# Patient Record
Sex: FEMALE | Race: WHITE | NOT HISPANIC OR LATINO | Employment: UNEMPLOYED | URBAN - METROPOLITAN AREA
[De-identification: names, ages, dates, MRNs, and addresses within clinical notes are randomized per-mention and may not be internally consistent; named-entity substitution may affect disease eponyms.]

---

## 2017-08-29 DIAGNOSIS — O24.119 PRE-EXISTING TYPE 2 DIABETES MELLITUS DURING PREGNANCY, ANTEPARTUM: Primary | ICD-10-CM

## 2017-09-07 ENCOUNTER — OFFICE VISIT (OUTPATIENT)
Dept: MATERNAL FETAL MEDICINE | Facility: CLINIC | Age: 32
End: 2017-09-07
Attending: OBSTETRICS & GYNECOLOGY
Payer: COMMERCIAL

## 2017-09-07 VITALS — WEIGHT: 238.75 LBS | SYSTOLIC BLOOD PRESSURE: 126 MMHG | DIASTOLIC BLOOD PRESSURE: 84 MMHG

## 2017-09-07 DIAGNOSIS — O24.312 PREEXISTING DIABETES COMPLICATING PREGNANCY IN SECOND TRIMESTER, ANTEPARTUM: ICD-10-CM

## 2017-09-07 DIAGNOSIS — O24.119 PRE-EXISTING TYPE 2 DIABETES MELLITUS DURING PREGNANCY, ANTEPARTUM: ICD-10-CM

## 2017-09-07 PROCEDURE — 99204 OFFICE O/P NEW MOD 45 MIN: CPT | Mod: 25,S$GLB,, | Performed by: OBSTETRICS & GYNECOLOGY

## 2017-09-07 PROCEDURE — 3008F BODY MASS INDEX DOCD: CPT | Mod: S$GLB,,, | Performed by: OBSTETRICS & GYNECOLOGY

## 2017-09-07 PROCEDURE — 99999 PR PBB SHADOW E&M-EST. PATIENT-LVL II: CPT | Mod: PBBFAC,,, | Performed by: OBSTETRICS & GYNECOLOGY

## 2017-09-07 PROCEDURE — 76811 OB US DETAILED SNGL FETUS: CPT | Mod: S$GLB,,, | Performed by: OBSTETRICS & GYNECOLOGY

## 2017-09-07 RX ORDER — METFORMIN HYDROCHLORIDE 1000 MG/1
1000 TABLET ORAL NIGHTLY
COMMUNITY
End: 2017-10-04 | Stop reason: DRUGHIGH

## 2017-09-07 RX ORDER — FLUTICASONE PROPIONATE 50 MCG
1 SPRAY, SUSPENSION (ML) NASAL DAILY
COMMUNITY

## 2017-09-07 RX ORDER — CETIRIZINE HYDROCHLORIDE 10 MG/1
10 TABLET ORAL DAILY
COMMUNITY

## 2017-09-07 RX ORDER — ACETAMINOPHEN 325 MG/1
325 TABLET ORAL EVERY 6 HOURS PRN
COMMUNITY

## 2017-09-07 RX ORDER — GUAIFENESIN 600 MG/1
1200 TABLET, EXTENDED RELEASE ORAL 2 TIMES DAILY
COMMUNITY

## 2017-09-07 RX ORDER — PRENATAL WITH FERROUS FUM AND FOLIC ACID 3080; 920; 120; 400; 22; 1.84; 3; 20; 10; 1; 12; 200; 27; 25; 2 [IU]/1; [IU]/1; MG/1; [IU]/1; MG/1; MG/1; MG/1; MG/1; MG/1; MG/1; UG/1; MG/1; MG/1; MG/1; MG/1
1 TABLET ORAL DAILY
COMMUNITY

## 2017-09-07 RX ORDER — METFORMIN HYDROCHLORIDE 500 MG/1
500 TABLET ORAL
COMMUNITY
End: 2017-09-13 | Stop reason: SDUPTHER

## 2017-09-07 NOTE — PROGRESS NOTES
Chief complaint: Diabetes in pregnancy    Provider requesting consultation: Dr. garcia    31 y.o. [id number] 24w6d EGA    PMH:  Past Medical History:   Diagnosis Date    Diabetes mellitus     type 2 and GDM        PObHx:  OB History    Para Term  AB Living   3 2 2     1   SAB TAB Ectopic Multiple Live Births           1      # Outcome Date GA Lbr Rodolfo/2nd Weight Sex Delivery Anes PTL Lv   3 Current            2 Term  40w0d  3.345 kg (7 lb 6 oz) M Vag-Spont  N RAMA   1 Term 07 39w0d  3.572 kg (7 lb 14 oz) M Vag-Spont  N       Birth Comments: GDM           PSH:History reviewed. No pertinent surgical history.    Family history:family history is not on file.    Social history: reports that she has never smoked. She has never used smokeless tobacco. She reports that she does not drink alcohol or use drugs.    A detailed fetal anatomical ultrasound was completed today.  See details in imaging section of [facility].    The patient has just recently moved from [facility] to [facility].  She has already been counseled on diabetes in pregnancy and is on diet and taking metformin 1000 mg at HS and 500 mg with breakfast.  I added 500 mg at dinner.    I had a long discussion concerning diet and intake, I did a dietary recall with her. She will delete fruit at lunch.  Her dinner and breakfast seems appropriate.  I demonstrated how to get nutritional information for chain restaurants on line.      Diabetes in pregnancy:  Today the patient was counseled on the risks of diabetes in pregnancy.  I reviewed the physiologic effects of pregnancy on diabetes and I stressed with the patient the need for meticulous glucose control.  I discussed with the patient the increased risks of fetal structural anomalies, macrosomia, prematurity, shoulder dystocia,  hyperbilirubinemia, and sudden stillbirth in those patients with poor glucose control.   I also discussed with the patient the need for increased fetal surveillance with  monthly fetal growth assessments and third trimester fetal testing.  I also reviewed the possibility of need for early delivery in those with poor glucose control or evidence of fetal growth abnormalities.  Recommendations from our MFM group at [facility]:  -Diet and/or medication should be used to keep fasting glucose levels <90 mg/dL and 2 hour postprandial levels <120 mg/dL.  Metformin or glyburide are our recommended first line therapy for those who are unable to control glucose with diet alone.  -In those patients with diabetes existing prior to pregnancy we recommend a fetal echocardiogram around 22-24 weeks gestation to rule out congenital heart disease.  -Periodic fetal growth assessments should be performed every 4-6 weeks to assess for fetal growth abnormalities.    -Twice weekly non-stress tests should be instituted beginning at 32 weeks EGA for those patients requiring medication for control of their diabetes and should be continued until delivery.  -Secondary to the high incidence of preeclampsia in patients with pregestational diabetes we recommend a baseline assessment of renal function.  This can be accomplished by a serum creatinine and a 24 hour urine collection or a urine protein to creatinine (P/C) ratio.    -In regards to timing of delivery our group refers to the ACOG article by Marcella et al from 2011 that addresses Timing of Indicated Late- and Early-Term Birth:  -Well controlled pregestational and gestational diabetes: we do not recommend late  or early term delivery  -Pregestational diabetes with vascular disease: 37-39 weeks  -Pregestational poorly controlled: 34-39 weeks (individualized to situation)  -Gestational poorly controlled on medication: 34-39 weeks (individualized to situation)    I will see her back in 1 week to f/u diabetic control.

## 2017-09-07 NOTE — LETTER
September 7, 2017      Je Diggs MD  [address] [facility] [address]           [facility] - Maternal Fetal Med  [address] [facility] [address]  Phone: [phone]          Patient: Berna Patrick   MR Number: [id number]   YOB: 1985   Date of Visit: 9/7/2017       Dear Dr. Je Diggs:    Thank you for referring Berna Patrick to me for evaluation. Attached you will find relevant portions of my assessment and plan of care.    If you have questions, please do not hesitate to call me. I look forward to following Berna Patrick along with you.    Sincerely,    Anjel Pinon MD    Enclosure  CC:  No Recipients    If you would like to receive this communication electronically, please contact [email or web address] or [phone] to request more information on [employer] Link access.    For providers and/or their staff who would like to refer a patient to [facility], please contact us through our one-stop-shop provider referral line, [facility] , at [phone].    If you feel you have received this communication in error or would no longer like to receive these types of communications, please e-mail [email or web address]

## 2017-09-13 ENCOUNTER — ROUTINE PRENATAL (OUTPATIENT)
Dept: MATERNAL FETAL MEDICINE | Facility: CLINIC | Age: 32
End: 2017-09-13
Attending: OBSTETRICS & GYNECOLOGY
Payer: COMMERCIAL

## 2017-09-13 VITALS — DIASTOLIC BLOOD PRESSURE: 74 MMHG | WEIGHT: 238.56 LBS | SYSTOLIC BLOOD PRESSURE: 112 MMHG

## 2017-09-13 DIAGNOSIS — O24.912 DIABETES MELLITUS AFFECTING PREGNANCY IN SECOND TRIMESTER: Primary | ICD-10-CM

## 2017-09-13 PROCEDURE — 3008F BODY MASS INDEX DOCD: CPT | Mod: S$GLB,,, | Performed by: OBSTETRICS & GYNECOLOGY

## 2017-09-13 PROCEDURE — 99214 OFFICE O/P EST MOD 30 MIN: CPT | Mod: S$GLB,,, | Performed by: OBSTETRICS & GYNECOLOGY

## 2017-09-13 PROCEDURE — 99999 PR PBB SHADOW E&M-EST. PATIENT-LVL II: CPT | Mod: PBBFAC,,, | Performed by: OBSTETRICS & GYNECOLOGY

## 2017-09-13 RX ORDER — GLYBURIDE 2.5 MG/1
TABLET ORAL
Qty: 30 TABLET | Refills: 11 | Status: SHIPPED | OUTPATIENT
Start: 2017-09-13

## 2017-09-13 RX ORDER — METFORMIN HYDROCHLORIDE 500 MG/1
TABLET ORAL
Qty: 120 TABLET | Refills: 3 | Status: SHIPPED | OUTPATIENT
Start: 2017-09-13 | End: 2017-10-04

## 2017-09-13 NOTE — PROGRESS NOTES
Follow up consultation regarding diabetes in pregnancy provided today.  Risks of uncontrolled diabetes in pregnancy reviewed once again.  Blood glucose measurements assessed. Her post dinner sugars remain elevated with other values in target range.   I did adjust her medications today.  I added 2.5 mg glyburide to dinner.  She will fax sugars next week.    Results of today's ultrasound discussed with patient.  I spent 30 minutes with patient today over half of which was in consultation separate of her ultrasound examination.     Referring physician to receive copy of today's consultation via electronic medical record.

## 2017-09-18 ENCOUNTER — TELEPHONE (OUTPATIENT)
Dept: MATERNAL FETAL MEDICINE | Facility: CLINIC | Age: 32
End: 2017-09-18

## 2017-09-18 NOTE — TELEPHONE ENCOUNTER
Patient calling because Dr. Pinon started Glyburide 2.5 mg at dinner and has noted some low fasting blood sugars for herself (In the 70s and 60s).    Patient's current medication management for her Type 2 DM includes:    Metformin:  - 500 mg at breakfast  - 500 mg at dinner  - 1000 mg at bedtime    Glyburide  - 2.5 mg at dinner    Patient states when her fasting blood sugars are low, she can feel it and becomes slightly light headed. Patient states she will eat a snack/healthy carbohydrate at this time.    Nurse let patient know she would review with Dr. Pinon accordingly.    Patient is currently in [place], but states she can e-mail or fax a blood sugar log to [facility] accordingly.    Nurse let patient know she would reach out to patient later this afternoon after speaking with Dr. Pinon.    Patient verbalized understanding of all information received.    ----- Message from Kirsten Stephen sent at 9/18/2017  9:21 AM CDT -----  Contact: Self  Pt DarnellBerna MRN [id number] is calling re her meds. Pt can be reached at [phone]. sh

## 2017-09-18 NOTE — TELEPHONE ENCOUNTER
After discussing with Dr. Pinon, nurse phoned patient and let her know that he is okay with the lower fasting blood sugars and he would like her to increase her PM snack slightly if possible.    Patient verbalized understanding of information received.

## 2017-09-19 ENCOUNTER — TELEPHONE (OUTPATIENT)
Dept: MATERNAL FETAL MEDICINE | Facility: CLINIC | Age: 32
End: 2017-09-19

## 2017-09-19 NOTE — TELEPHONE ENCOUNTER
----- Message from Kirsten Stephen sent at 9/19/2017 10:47 AM CDT -----  Contact: Self  Pt Berna Patrick MRN [id number] is calling to see if test strips can be called in for her. Pt can be reached at [phone].Pt is in [facility] and is wondering if they can be called into the [facility] near her.     Test strip model #     Called into pharmacy [facility] at [phone]

## 2017-09-22 ENCOUNTER — TELEPHONE (OUTPATIENT)
Dept: MATERNAL FETAL MEDICINE | Facility: CLINIC | Age: 32
End: 2017-09-22

## 2017-09-22 NOTE — TELEPHONE ENCOUNTER
reviewed patient's blood sugars and patient was notified that there would be no changes to her medication to keep doing what she was doing and see us in 2 weeks.  Patient verbalized her understanding.

## 2017-09-28 DIAGNOSIS — O35.9XX0 SUSPECTED FETAL ABNORMALITY AFFECTING MANAGEMENT OF MOTHER, ANTEPARTUM, NOT APPLICABLE OR UNSPECIFIED FETUS: Primary | ICD-10-CM

## 2017-10-02 ENCOUNTER — OFFICE VISIT (OUTPATIENT)
Dept: PEDIATRIC CARDIOLOGY | Facility: CLINIC | Age: 32
End: 2017-10-02
Payer: COMMERCIAL

## 2017-10-02 ENCOUNTER — CLINICAL SUPPORT (OUTPATIENT)
Dept: PEDIATRIC CARDIOLOGY | Facility: CLINIC | Age: 32
End: 2017-10-02
Payer: COMMERCIAL

## 2017-10-02 VITALS
WEIGHT: 244.06 LBS | HEART RATE: 99 BPM | BODY MASS INDEX: 40.66 KG/M2 | DIASTOLIC BLOOD PRESSURE: 66 MMHG | HEIGHT: 65 IN | SYSTOLIC BLOOD PRESSURE: 105 MMHG

## 2017-10-02 DIAGNOSIS — O35.9XX0 SUSPECTED FETAL ABNORMALITY AFFECTING MANAGEMENT OF MOTHER, ANTEPARTUM, NOT APPLICABLE OR UNSPECIFIED FETUS: ICD-10-CM

## 2017-10-02 DIAGNOSIS — Z03.73 FETAL ANOMALY SUSPECTED BUT NOT FOUND: Primary | ICD-10-CM

## 2017-10-02 PROCEDURE — 99999 PR PBB SHADOW E&M-EST. PATIENT-LVL III: CPT | Mod: PBBFAC,,, | Performed by: PEDIATRICS

## 2017-10-02 PROCEDURE — 76825 ECHO EXAM OF FETAL HEART: CPT | Mod: S$GLB,,, | Performed by: PEDIATRICS

## 2017-10-02 PROCEDURE — 76827 ECHO EXAM OF FETAL HEART: CPT | Mod: S$GLB,,, | Performed by: PEDIATRICS

## 2017-10-02 PROCEDURE — 93325 DOPPLER ECHO COLOR FLOW MAPG: CPT | Mod: S$GLB,,, | Performed by: PEDIATRICS

## 2017-10-02 PROCEDURE — 99205 OFFICE O/P NEW HI 60 MIN: CPT | Mod: 25,S$GLB,, | Performed by: PEDIATRICS

## 2017-10-02 NOTE — LETTER
October 4, 2017      Je Diggs MD  [address] [facility]  [place] [address]           [place]- Pediatric Cardiology  [address] [place] [address]  [address]  [place] [address]  Phone: [phone]  Fax: [phone]          Patient: Berna Patrick   MR Number: [id number]   YOB: 1985   Date of Visit: 10/2/2017       Dear Dr. Je Diggs:    Thank you for referring Berna Patrick to me for evaluation. Attached you will find relevant portions of my assessment and plan of care.    If you have questions, please do not hesitate to call me. I look forward to following Berna Patrick along with you.    Sincerely,    Flip Davis MD    Enclosure  CC:  No Recipients    If you would like to receive this communication electronically, please contact [email or web address] or [phone] to request more information on [employer] Link access.    For providers and/or their staff who would like to refer a patient to [facility], please contact us through our one-stop-shop provider referral line, [facility], at [phone].    If you feel you have received this communication in error or would no longer like to receive these types of communications, please e-mail [email or web address]

## 2017-10-04 ENCOUNTER — OFFICE VISIT (OUTPATIENT)
Dept: MATERNAL FETAL MEDICINE | Facility: CLINIC | Age: 32
End: 2017-10-04
Payer: COMMERCIAL

## 2017-10-04 VITALS
WEIGHT: 243.81 LBS | SYSTOLIC BLOOD PRESSURE: 104 MMHG | DIASTOLIC BLOOD PRESSURE: 70 MMHG | BODY MASS INDEX: 40.58 KG/M2

## 2017-10-04 DIAGNOSIS — O24.912 DIABETES MELLITUS AFFECTING PREGNANCY IN SECOND TRIMESTER: ICD-10-CM

## 2017-10-04 DIAGNOSIS — O24.913 DIABETES MELLITUS DURING PREGNANCY IN THIRD TRIMESTER, UNSPECIFIED DIABETES MELLITUS TYPE: ICD-10-CM

## 2017-10-04 PROCEDURE — 99999 PR PBB SHADOW E&M-EST. PATIENT-LVL II: CPT | Mod: PBBFAC,,, | Performed by: OBSTETRICS & GYNECOLOGY

## 2017-10-04 PROCEDURE — 99213 OFFICE O/P EST LOW 20 MIN: CPT | Mod: 25,S$GLB,, | Performed by: OBSTETRICS & GYNECOLOGY

## 2017-10-04 PROCEDURE — 76816 OB US FOLLOW-UP PER FETUS: CPT | Mod: S$GLB,,, | Performed by: OBSTETRICS & GYNECOLOGY

## 2017-10-04 RX ORDER — METFORMIN HYDROCHLORIDE 500 MG/1
TABLET ORAL
Qty: 120 TABLET | Refills: 3 | Status: SHIPPED | OUTPATIENT
Start: 2017-10-04

## 2017-10-04 NOTE — PROGRESS NOTES
[facility] Fetal [facility]iology [facility]    Today, I had the pleasure of evaluating Berna Patrick who is now 31 y.o. and carrying her third pregnancy at 28 3/7 weeks gestation with an AIDA of 17. She was referred for evaluation of the fetal heart due to pre-existing and gestational diabetes mellitus that his controlled with diet, metformin, and glyburide.    She is carrying a female  fetus, named Payal Felipe.  She has felt the baby move.       Obstetric History:    .  Her OB history is otherwise unremarkable.  Her OB care is by Dr. Je Diggs.  She has seen Dr. Pinon of the [facility] service.    Past Medical History:   Diagnosis Date    Diabetes mellitus     type 2 and GDM          Current Outpatient Prescriptions:     acetaminophen (TYLENOL) 325 MG tablet, Take 325 mg by mouth every 6 (six) hours as needed for Pain., Disp: , Rfl:     cetirizine (ZYRTEC) 10 MG tablet, Take 10 mg by mouth once daily., Disp: , Rfl:     fluticasone (FLONASE) 50 mcg/actuation nasal spray, 1 spray by Each Nare route once daily., Disp: , Rfl:     glyBURIDE (DIABETA) 2.5 MG tablet, 2.5 mg with dinner, Disp: 30 tablet, Rfl: 11    guaifenesin (MUCINEX) 600 mg 12 hr tablet, Take 1,200 mg by mouth 2 (two) times daily., Disp: , Rfl:     metformin (GLUCOPHAGE) 1000 MG tablet, Take 1,000 mg by mouth nightly., Disp: , Rfl:     metformin (GLUCOPHAGE) 500 MG tablet, 500 mg with breakfast, 500 mg with dinner and 1000 mg at HS, Disp: 120 tablet, Rfl: 3    PNV,CALCIUM 72/IRON/FOLIC ACID (PRENATAL VITAMIN) Tab, Take 1 tablet by mouth once daily., Disp: , Rfl:      Allergies: Amoxicillin    Family History: Negative for congenital heart disease, early coronary artery disease, sudden unexplained death, connective tissues disorders, genetic syndromes, multiple miscarriages or other congenital anomalies.    Social History: Ms. Berna Patrick is  to the father of the baby.  The father of the baby is  involved. He works as an  at [employer].  She is a homemaker.    FETAL ECHOCARDIOGRAM (summary):  Normally connected heart.  Normal sinus rhythm throughout the study.  Normal fetal atrial and ductal level shunting.  No ventricular level shunting.  Normal atrioventricular and semilunar valve structure and function.  Normal ductal and aortic arches.  Normal biventricular size and systolic function.  No pericardial effusion.  Head circumference measures normal for gestational age, but the femur length  is about 2 weeks behind.  (Full report in electronic medical record)    Impression:  Single active female fetus at 28 wga.  Normal fetal echocardiogram.      Todays fetal echocardiogram is normal, within the limitations of fetal echocardiography.  I discussed with her that fetal echocardiography is insufficiently sensitive to rule out all septal defects, anomalies of pulmonary and systemic veins, arch anomalies, and some valvar abnormalities, nor can it ensure that the ductus arteriosus and foramen ovale will spontaneously close.     Recommendations:  Location, timing, and mode of delivery will be determined by the obstetrical team.  She does not require further follow-up in the fetal echocardiography clinic, but I would be happy to see her again if additional questions or concerns arise.    Should there be any concerns about the baby's heart after birth, a post-erasto echocardiogram and cardiology consultation are recommended.     The above information was discussed in detail including the use of diagrams, 60 minutes were used for the evaluation with half of that time in discussion and counseling.    Flip Davis MD, MPH  Pediatric and Fetal Cardiology  [facility]  [address] [place] [address]    Office: [phone]  Pager: [phone]

## 2017-10-05 NOTE — PROGRESS NOTES
"Indication  ========    r/t md: Evaluation of fetal growth/ BS check .    History  ======    General History  Other: Afp Tetra negative  age 1:690  DSR 1:2353    Previous Outcomes  Preg. no. 1  Outcome: Live YOB: 2005  Gest. age 39 w + 0 d  Gender: male  Details: vaginal delivery 7 lbs 14 oz GDM  Preg. no. 2  Outcome: Live YOB: 2011  Gest. age 40 w + 0 d  Gender: male  Details: vaginal delivery 7 lbs 6oz   3  Para 2  Hernandez children born living (T) 2  Hernandez children born (T) 2  Hernandez living children (L) 2  Risk Factors  History risk factors: Diabetes mellitus  Details: Type 2    Maternal Assessment  =================    Weight 111 kg  Weight (lb) 245 lb  BP syst 104 mmHg  BP diast 70 mmHg    Method  ======    Transabdominal ultrasound examination. View: Sufficient.    Pregnancy  =========    Hernandez pregnancy. Number of fetuses: 1.    Dating  ======    Cycle: regular cycle  GA by "stated dating" 28 w + 5 d  AIDA by "stated dating": 2017  Ultrasound examination on: 10/4/2017  GA by U/S based upon: AC, BPD, Femur, HC  GA by U/S 29 w + 4 d  AIDA by U/S: 2017  Assigned: Dating performed on 2017, based on the external assessment  Assigned GA 28 w + 5 d  Assigned AIDA: 2017    General Evaluation  ==============    Cardiac activity: present.  bpm.  Fetal movements: visualized.  Presentation: cephalic.  Placenta: posterior.  Umbilical cord: 3 vessel cord.  Amniotic fluid: MVP 5.6 cm.    Fetal Biometry  ============    Fetal Biometry  BPD 75.1 mm 30w 1d Hadlock  OFD 94.5 mm 30w 4d Corby  .4 mm 30w 1d Hadlock  .3 mm 30w 1d Hadlock  Femur 52.1 mm 27w 5d Hadlock  CM 2.6 mm  EFW 1,378 g 52% Silas  Calculated by: Hadlock (BPD-HC-AC-FL)  EFW (lb) 3 lb  EFW (oz) 1 oz  Cephalic index 0.79  HC / AC 1.06  FL / BPD 0.69  FL / AC 0.20  MVP 5.6 cm   bpm  Head / Face / Neck   3.8 mm    Fetal Anatomy  ============    Cranium: normal  Midline " falx: suboptimal  Cavum septi pellucidi: normal  Cerebellum: suboptimal  Cisterna magna: normal  Rt lateral ventricle: normal  Lt lateral ventricle: suboptimal  Rt choroid plexus: normal  Lt choroid plexus: suboptimal  Posterior fossa: normal  Lips: suboptimal  Nose: suboptimal  Stomach: normal  Kidneys: normal  Bladder: normal  Genitals: documented previously  Gender: female  Other: A full anatomy survey previously performed.    Consultation  ==========    Type: Follow up blood sugars.  Ms. Patrick returns for a follow up blood sugar check.  She is taking 500mg Metformin at breakfast, 500mg at dinner, and 1000mg at hs. She was also started on glyburide 2.5mg at dinner. Her  postprandial dinner blood sugars are overall within range. Her postprandial breakfast and lunch values are also in range. She has had some low  fasting blood sugars (many in the 60s ) with others in the 70s and reports if she is too low prior to bedtime she will not take it. She does try to  eat her snack.  We will decrease her bedtime Metformin to 500mg and I encouraged her to check a 2am blood sugar if any concerns.  I encouraged her to eat her snack.  /70  Fetal echo was normal.    A total of 15 minutes was spent in face to face time with greater than half of that time spent in counseling and coordination of care.      Impression  =========    Hernandez live intrauterine pregnancy.  Overall normal fetal growth.  Normal amniotic fluid volume.  Some of the anatomy was suboptimally visualized. The anatomy that was seen appeared normal.      Recommendation  ==============    Patient to fax blood sugars in next week to [facility].  Advised to call for significant high or low blood sugars.  Follow up blood sugar check with MFM in 2 weeks.  Follow up ultrasound for growth and blood sugar check with [facility] in 4 weeks.

## 2017-10-06 ENCOUNTER — TELEPHONE (OUTPATIENT)
Dept: MATERNAL FETAL MEDICINE | Facility: CLINIC | Age: 32
End: 2017-10-06

## 2017-10-06 NOTE — TELEPHONE ENCOUNTER
Contacted pt to clarify her follow-up MFM visits.    Pt is to fax BS log to Dr. Loera next week around 10/11/17 and then have 2wk f/u office visit for BS review.    Pt will be scheduled for f/u growth and office visit once the November schedule is completely open.    Pt verbalized understanding of information.

## 2017-10-19 ENCOUNTER — OFFICE VISIT (OUTPATIENT)
Dept: MATERNAL FETAL MEDICINE | Facility: CLINIC | Age: 32
End: 2017-10-19
Payer: COMMERCIAL

## 2017-10-19 VITALS
BODY MASS INDEX: 40.43 KG/M2 | DIASTOLIC BLOOD PRESSURE: 82 MMHG | WEIGHT: 242.94 LBS | SYSTOLIC BLOOD PRESSURE: 112 MMHG

## 2017-10-19 DIAGNOSIS — O24.113 PRE-EXISTING TYPE 2 DIABETES MELLITUS DURING PREGNANCY IN THIRD TRIMESTER: Primary | ICD-10-CM

## 2017-10-19 PROCEDURE — 99213 OFFICE O/P EST LOW 20 MIN: CPT | Mod: S$GLB,,, | Performed by: OBSTETRICS & GYNECOLOGY

## 2017-10-19 PROCEDURE — 99999 PR PBB SHADOW E&M-EST. PATIENT-LVL III: CPT | Mod: PBBFAC,,, | Performed by: OBSTETRICS & GYNECOLOGY

## 2017-10-19 NOTE — PROGRESS NOTES
Ms. Patrick has no complaints currently.   /82  -142    Diabetic regimen: Metformin 500mg breakfast, 500mg dinner, 500mg bedtime, Glyburide 2.5mg dinner      Fastings: 71-86 (one value of 69), does fine with values in the 70s  2 hour post breakfast: (10 of 16 elevated)  2 hour post lunch:  (majority within range)  2 hour post dinner: 103-133 ( 8/16 elevated)    A/P:  1. IUP at 30 and 6  2. Type 2 Diabetes: The patient does feel some elevations are diet related. We will change regimen to Metformin 1000 mg breakfast, 500mg dinner, 500mg bedtime and continue glyburide 2.5mg dinner. We again reviewed oral agents in pregnancy. We reviewed at times the potential need for insulin. We also discussed that glyburide may have potential effects on  outcomes. The patient had been started on two oral agents likely to attempt to avoid insulin. Fetal echo was normal. Follow up growth ultrasound and blood sugar check in 2 weeks. Call if BS less than 70 or over 200 or if any symptoms or concerns. Discussed delivery timing will depend on blood sugar control, comorbidities and fetal growth. We discussed delivery would not be past 39 weeks but may need to be earlier for other reasons. Recommend  fetal surveillance with primary ob at 32 weeks with twice weekly NSTs and weekly MVP. Precautions were given.    RTC in 2 weeks for BS check and ultrasound.    A total of 15 minutes was spent in face to face time with greater than half of that time spent in counseling and coordination of care.

## 2017-10-19 NOTE — LETTER
October 19, 2017      Je Diggs MD  [address] [facility] [address]           [facility] - Maternal Fetal Med  [address] [facility] [address]  Phone: [phone]          Patient: Berna Patrick   MR Number: [id number]   YOB: 1985   Date of Visit: 10/19/2017       Dear Dr. Je Diggs:    Thank you for referring Berna Patrick to me for evaluation. Attached you will find relevant portions of my assessment and plan of care.    If you have questions, please do not hesitate to call me. I look forward to following Berna Patrick along with you.    Sincerely,    Bouchra Loera MD    Enclosure  CC:  No Recipients    If you would like to receive this communication electronically, please contact [email or web address] or [phone] to request more information on [employer] Link access.    For providers and/or their staff who would like to refer a patient to [facility], please contact us through our one-stop-shop provider referral line, [facility] , at [phone].    If you feel you have received this communication in error or would no longer like to receive these types of communications, please e-mail [email or web address]

## 2017-11-03 ENCOUNTER — OFFICE VISIT (OUTPATIENT)
Dept: MATERNAL FETAL MEDICINE | Facility: CLINIC | Age: 32
End: 2017-11-03
Attending: OBSTETRICS & GYNECOLOGY
Payer: COMMERCIAL

## 2017-11-03 VITALS — BODY MASS INDEX: 40.36 KG/M2 | SYSTOLIC BLOOD PRESSURE: 116 MMHG | DIASTOLIC BLOOD PRESSURE: 78 MMHG | WEIGHT: 242.5 LBS

## 2017-11-03 DIAGNOSIS — O24.912 DIABETES MELLITUS AFFECTING PREGNANCY IN SECOND TRIMESTER: ICD-10-CM

## 2017-11-03 PROCEDURE — 99213 OFFICE O/P EST LOW 20 MIN: CPT | Mod: 25,S$GLB,, | Performed by: OBSTETRICS & GYNECOLOGY

## 2017-11-03 PROCEDURE — 76819 FETAL BIOPHYS PROFIL W/O NST: CPT | Mod: S$GLB,,, | Performed by: OBSTETRICS & GYNECOLOGY

## 2017-11-03 PROCEDURE — 99999 PR PBB SHADOW E&M-EST. PATIENT-LVL II: CPT | Mod: PBBFAC,,, | Performed by: OBSTETRICS & GYNECOLOGY

## 2017-11-03 PROCEDURE — 76816 OB US FOLLOW-UP PER FETUS: CPT | Mod: S$GLB,,, | Performed by: OBSTETRICS & GYNECOLOGY

## 2017-11-03 NOTE — LETTER
November 3, 2017      Je Diggs MD  [address] [facility] [address] [address]           [facility] - Maternal Fetal Med  [address] [facility] [address]  Phone: [phone]          Patient: Berna Patrick   MR Number: [id number]   YOB: 1985   Date of Visit: 11/3/2017       Dear Dr. Je Diggs:    Thank you for referring Berna Patrick to me for evaluation. Attached you will find relevant portions of my assessment and plan of care.    If you have questions, please do not hesitate to call me. I look forward to following Berna Patrick along with you.    Sincerely,    Anjel Pinon MD    Enclosure  CC:  No Recipients    If you would like to receive this communication electronically, please contact [email or web address] or [phone] to request more information on [employer] Link access.    For providers and/or their staff who would like to refer a patient to [facility], please contact us through our one-stop-shop provider referral line, [facility], at [phone].    If you feel you have received this communication in error or would no longer like to receive these types of communications, please e-mail [email or web address]

## 2017-11-03 NOTE — PROGRESS NOTES
Follow up consultation regarding diabetes in pregnancy provided today.  Risks of uncontrolled diabetes in pregnancy reviewed once again.  Blood glucose measurements assessed.  She is under good control on her present regimen.  I did not adjust her medications today.      Results of today's ultrasound discussed with patient.  I spent 15 minutes with patient today over half of which was in consultation separate of her ultrasound examination.     Referring physician to receive copy of today's consultation via electronic medical record.

## 2017-11-22 ENCOUNTER — TELEPHONE (OUTPATIENT)
Dept: MATERNAL FETAL MEDICINE | Facility: CLINIC | Age: 32
End: 2017-11-22

## 2017-11-22 NOTE — TELEPHONE ENCOUNTER
Message left for patient to call [facility] clinic at [phone]. Dr Pinon reviewed blood sugar log. No change to medications.

## 2018-05-16 DIAGNOSIS — O24.112 TYPE 2 DIABETES MELLITUS AFFECTING PREGNANCY IN SECOND TRIMESTER, ANTEPARTUM: Primary | ICD-10-CM

## 2018-05-16 DIAGNOSIS — Z36.89 ENCOUNTER FOR FETAL ANATOMIC SURVEY: ICD-10-CM

## 2018-05-29 ENCOUNTER — OFFICE VISIT (OUTPATIENT)
Dept: MATERNAL FETAL MEDICINE | Facility: CLINIC | Age: 33
End: 2018-05-29
Payer: COMMERCIAL

## 2018-05-29 VITALS — DIASTOLIC BLOOD PRESSURE: 86 MMHG | WEIGHT: 230.19 LBS | BODY MASS INDEX: 38.3 KG/M2 | SYSTOLIC BLOOD PRESSURE: 112 MMHG

## 2018-05-29 DIAGNOSIS — O24.112 TYPE 2 DIABETES MELLITUS AFFECTING PREGNANCY IN SECOND TRIMESTER, ANTEPARTUM: ICD-10-CM

## 2018-05-29 DIAGNOSIS — Z36.89 ENCOUNTER FOR FETAL ANATOMIC SURVEY: ICD-10-CM

## 2018-05-29 PROCEDURE — 76811 OB US DETAILED SNGL FETUS: CPT | Mod: S$GLB,,, | Performed by: OBSTETRICS & GYNECOLOGY

## 2018-05-29 PROCEDURE — 99999 PR PBB SHADOW E&M-EST. PATIENT-LVL II: CPT | Mod: PBBFAC,,, | Performed by: OBSTETRICS & GYNECOLOGY

## 2018-05-29 PROCEDURE — 99242 OFF/OP CONSLTJ NEW/EST SF 20: CPT | Mod: 25,S$GLB,, | Performed by: OBSTETRICS & GYNECOLOGY

## 2018-05-29 RX ORDER — METFORMIN HYDROCHLORIDE 500 MG/1
500 TABLET ORAL 2 TIMES DAILY WITH MEALS
Qty: 60 TABLET | Refills: 11 | Status: SHIPPED | OUTPATIENT
Start: 2018-05-29 | End: 2019-05-29

## 2018-05-30 NOTE — PROGRESS NOTES
"Indication  ========    New Consult: Fetal anatomy survey.    History  ======    General History  Blood group: 0  Rhesus: Rh negative  Other:    Previous Outcomes  Preg. no. 1  Outcome: Live YOB: 2005  Gest. age 39 w + 0 d  Gender: male  Details: vaginal delivery 7 lbs 14 oz GDM  Preg. no. 2  Outcome: Live YOB: 2011  Gest. age 40 w + 0 d  Gender: male  Details: vaginal delivery 7 lbs 6oz  Preg. no. 3  Outcome: Live YOB: 2015  Gest. age 36 w + 0 d  Gender: female  Details: Vaginal delivery 5 lbs 8 oz; Pre-E; GDM   4  Para 3  Hernandez children born living (T) 2  Hernandez children born (T) 2  Hernandez children born (P) 1  Hernandez living children (L) 3  Hernandez children born living (P) 1  Risk Factors  History risk factors: Diabetes mellitus  Details: Type 2    Pregnancy History  ==============    Maternal Lab Tests  Result: Serum Integrated II- Negative; DSR: 1:9300    Method  ======    Transabdominal ultrasound examination. View: Suboptimal view: limited by maternal body habitus. Suboptimal view: limited by fetal position.    Pregnancy  =========    Hernandez pregnancy. Number of fetuses: 1.    Dating  ======    LMP on: 2018  Cycle: LMP date uncertain, regular cycle  GA by LMP 20 w + 5 d  AIDA by LMP: 10/11/2018  GA by "stated dating" 20 w + 3 d  AIDA by "stated dating": 10/13/2018  Ultrasound examination on: 2018  GA by U/S based upon: AC, BPD, Femur, HC  GA by U/S 21 w + 0 d  AIDA by U/S: 10/9/2018  Assigned: Dating performed on 2018, based on the external assessment  Assigned GA 20 w + 3 d  Assigned AIDA: 10/13/2018    General Evaluation  ==============    Cardiac activity: present.  bpm.  Fetal movements: visualized.  Presentation: breech.  Placenta:  Placental site: fundal.  Umbilical cord: Cord vessels: 3 vessel cord. Cord insertion: placental insertion: normal.  Amniotic fluid: normal amount.    Fetal Biometry  ============    Fetal " Biometry  BPD 48.0 mm 20w 4d Hadlock  OFD 62.7 mm 21w 3d Corby  .5 mm 20w 3d Hadlock  .7 mm 22w 0d Hadlock  Femur 35.5 mm 21w 2d Hadlock  Cerebellum tr 21.0 mm 20w 5d Jim  CM 3.8 mm   g 51% Silas  Calculated by: Hadlock (BPD-HC-AC-FL)  EFW (lb) 0 lb  EFW (oz) 15 oz  Cephalic index 0.77  HC / AC 1.06  FL / BPD 0.74  FL / AC 0.21   bpm  Head / Face / Neck   8.6 mm    Fetal Anatomy  ===========    Cranium: normal  Lateral ventricles: normal  Choroid plexus: normal  Midline falx: normal  Cavum septi pellucidi: normal  Cerebellum: normal  Cisterna magna: normal  Head shape: normal  Rt lateral ventricle: normal  Lt lateral ventricle: normal  Rt choroid plexus: normal  Lt choroid plexus: normal  Parenchyma: normal  Third ventricle: normal  Posterior fossa: normal  Cerebellar lobes: normal  Vermis: normal  Neck: appears normal  Nuchal fold: not examined  Lips: suboptimal  Profile: normal  Nose: suboptimal  Maxilla: normal  Mandible: normal  4-chamber view: suboptimal  RVOT: suboptimal  LVOT: normal  Heart / Thorax: Septal views: sub opt  Situs: normal  Aortic arch: suboptimal  Ductal arch: suboptimal  SVC: suboptimal  IVC: suboptimal  3-vessel view: suboptimal  3-vessel-trachea view: suboptimal  Cardiac position: normal  Cardiac axis: normal  Cardiac size: normal  Cardiac rhythm: normal  Rt lung: suboptimal  Lt lung: suboptimal  Diaphragm: suboptimal  Cord insertion: suboptimal  Stomach: normal  Kidneys: normal  Bladder: normal  Genitals: normal  Abdom. wall: suboptimal  Abdom. cavity: normal  Rt kidney: suboptimal  Lt kidney: suboptimal  Liver: normal  Bowel: normal  Rt renal artery: normal  Lt renal artery: normal  Cervical spine: suboptimal  Thoracic spine: suboptimal  Lumbar spine: suboptimal  Sacral spine: suboptimal  Arms: normal  Legs: normal  Rt arm: normal  Lt arm: normal  Rt upper arm: normal  Rt forearm: suboptimal  Rt hand: suboptimal  Lt hand: present  Rt leg: normal  Lt  "leg: normal  Rt foot: suboptimal  Lt foot: suboptimal  Position of hands: normal  Position of feet: normal  Gender: male    Maternal Structures  ===============    Uterus / Cervix  Uterus: Normal  Cervical length 61.0 mm  Ovaries / Tubes / Adnexa  Rt ovary: Not visualized  Lt ovary: Not visualized  Other: Uterus and adnexa normal    Consultation  ==========    Consult for GDM  5'5" 104.4 kg 112/86  Meds: Zyrtec, Mucinex, PNV  PMH: GDM x 2, h/o preeclampsia  PSH: None  SH: Denies  FH: Denies  OB: Neg integrated screen  07: , 7#13 oz 39 weeks had GDM  5/15/13:  7# 8 oz no GDM  17:  36 weeks, severe preeclampsia, required Mg, GDM, 5# 8 oz  A1C earlier was 5.0. Has been patterning blood sugars and did not do glucose challenge test. Over last 1-2 weeks ~ 50% of blood sugars  elevated. In previous pregnancy was managed with metformin for majority of pregnancy. Glyburide was added towards the end and she  experienced symptomatic hypoglycemia and does not want to take glyburide again. Last baby was readmitted for jaundice on the [place].  GDM  Gestational diabetes is marked by carbohydrate intolerance in pregnancy. Gestational diabetes mellitus (GDM) is defined as insulin  resistance of variable severity with onset or first recognition during pregnancy. It complicates 6-9% of all pregnancies (using current  definitions)  and is increasing as the prevalence of obesity continues to rise. Appropriate diagnosis and treatment of women with GDM reduces fetal  macrosomia, preeclampsia, gestational hypertension,  delivery, and composite  morbidity (shoulder dystocia, nerve palsy,  and bone fracture). Neonates of women with GDM are also at increased risk for  hypoglycemia and hyperbilirubinemia as well as  obesity and diabetes in adolescence and adulthood. Women diagnosed with GDM are at increased risk of developing Type 2 diabetes mellitus  later in life.    I counseled the patient " regarding the risks of gestational diabetes. Patients who require hypoglycemia agents have an increased risk of  stillbirth. She understands that  outcome is linked to her ability to achieve glycemic control.    The goal of treatment is to have a fasting blood sugar less than 95 mg/dL and 2 hour postprandials less than 120 mg/dL. Insulin is the ideal  agent for treatment of GDM. The basis for this has focused on a lack of data on long-term outcomes of maternal and childhood outcomes when  women are treated with oral agents in pregnancy. However, there are no data demonstrating adverse short-term adverse maternal or   effects from oral diabetic agents in pregnancy. If oral agents are used, our division utilizes metformin as the first line oral hypoglycemic agent.      Metformin inhibits hepatic gluconeogenesis and glucose absorption while also stimulating peripheral glucose uptake. Metformin crosses the  placenta and fetal metformin concentrations approach those of the mother. While there are not long-term outcome data on metformin use in  pregnancy, at least one study has demonstrated similar 2-year developmental outcomes when compared to insulin (Steven et al). There does  not appear to be any differences in short-term  outcomes for neonates whose mothers were treated with metformin compared to  insulin. However, there does appear to be a reduction in the rate of hypertensive disease, but a slight increase in the rate of  birth  (Aditi et al). Between 26 - 46% of women treated with metformin will ultimately require insulin for glycemic control.    Glyburide is a second-generation sulfonylurea, which works by stimulating the pancreas to release more insulin. This medication is  contraindicated in those with a sulfa allergy. The major side effect of glyburide is therefore hypoglycemia. Two recent meta-analyses (Vida  et al; Aditi et al) have demonstrated worse   outcomes in women treated with glyburide compared to insulin with an increase in  respiratory morbidity, macrosomia, birth injury and hypoglycemia. This is thought to be related to the fact that glyburide crosses the placenta  and stimulates fetal insulin release. In clinical trials, 4-16 % of women fail glyburide and ultimately require insulin. Given the recent concerns  regarding the use of glyburide for treatment of GDM, we recommend that glyburide use should be reserved only for those women who decline  insulin and who are intolerant of metformin.    Antepartum testing is indicated for those patients on hypoglycemia agents to reduce the incidence of fetal demise. Fetal growth should be  followed with a scan between 37 and 38 weeks to evaluate for fetal macrosomia and delivery planning.  Women diagnosed with GDM are at increased risk of developing Type 2 diabetes mellitus later in life. Postpartum glucose testing using a 75  g  oral glucose tolerance test should be performed at 6-12 weeks after delivery.      Recommendations:  1. The patient is aware how to adjust her diet.  2. Please arrange an appointment with a diabetic educator if she desires this. She understands how to monitor her blood sugars. She was  instructed to check a fasting and 2 hour postprandial blood sugars daily. Today given ~ 50% of blood sugars out of range, we will start  metformin 500 mg BID. She understands that the goal of therapy as to achieve a fasting blood sugar less than 95 and 2 hour postprandials  less than 120.  3. A scan should be performed every 4-6 weeks to exclude macrosomia and determine further delivery management.  4.  fetal surveillance is recommended at 32 weeks with delivery between 39 and 40 weeks if well controlled. If other comorbidities or  poor control, delivery is recommended between 38 and 39 weeks.  5. If EFW is 4500g or greater, recommend offering csection for delivery.  6. Patients with GDM are cured  by delivery. All medications can be stopped post-partum. However, some patients with GDM have undiagnosed  Type 2 diabetes. Therefore, a single post-partum fasting blood sugar can be used to screen patients for undiagnosed Type 2 diabetes prior to  discharge. If the fasting blood sugar is > 126 mg/dL, continue monitoring patterned blood sugars and treat as needed.  7. Patients with GDM should be screened for Type 2 DM at 6-8 weeks after delivery with a 75-g, 2 hour glucose tolerance test. This testing  should be scheduled when the patient returns for her post-partum exam or the patient should be instructed to follow-up with her primary care  physician. For women who are exclusively breastfeeding, it is reasonable to wait until they are done lactating prior to performing screening.  They also require life-long monitoring for Type 2 diabetes and should be instructed on the importance of continued care with their primary care  physician for periodic retesting and initiation of lifestyle modification with diet and exercise.    Time  I overall spent approximately 30 minutes in face to face time with the patient and her family, greater than 50% of which was in counseling and  care coordination.    See my consultation note from today in [facility] for additional details.    Impression  =========    A detailed fetal anatomic ultrasound examination was performed for the following high risk indication: diabetes, BMI > 35. No fetal structural  malformations are identified; however, fetal imaging is incomplete today. A follow-up study will be scheduled to complete the fetal anatomic  survey. Fetal size today is consistent with established gestational age. Cervical length is normal. Placental location is normal without evidence  of previa.    Recommendation  ==============    Prescription for metformin 500 mg BID provided to patient's pharmacy.  Patient instructed to start taking 81 mg aspirin for preeclampsia prevention.  Recommend  follow-up with MFM in 4 weeks to complete anatomy survey; if cardiac anatomy unable to be completed, will refer for fetal  echocardiogram given risk factors.

## 2018-06-26 ENCOUNTER — OFFICE VISIT (OUTPATIENT)
Dept: MATERNAL FETAL MEDICINE | Facility: CLINIC | Age: 33
End: 2018-06-26
Payer: COMMERCIAL

## 2018-06-26 DIAGNOSIS — O24.415 GESTATIONAL DIABETES MELLITUS (GDM) IN SECOND TRIMESTER CONTROLLED ON ORAL HYPOGLYCEMIC DRUG: ICD-10-CM

## 2018-06-26 DIAGNOSIS — O24.112 TYPE 2 DIABETES MELLITUS AFFECTING PREGNANCY IN SECOND TRIMESTER, ANTEPARTUM: ICD-10-CM

## 2018-06-26 PROCEDURE — 99499 UNLISTED E&M SERVICE: CPT | Mod: S$GLB,,, | Performed by: OBSTETRICS & GYNECOLOGY

## 2018-06-26 PROCEDURE — 76816 OB US FOLLOW-UP PER FETUS: CPT | Mod: S$GLB,,, | Performed by: OBSTETRICS & GYNECOLOGY
